# Patient Record
Sex: FEMALE | Race: BLACK OR AFRICAN AMERICAN
[De-identification: names, ages, dates, MRNs, and addresses within clinical notes are randomized per-mention and may not be internally consistent; named-entity substitution may affect disease eponyms.]

---

## 2023-04-21 ENCOUNTER — DASHBOARD ENCOUNTERS (OUTPATIENT)
Age: 58
End: 2023-04-21

## 2023-04-21 ENCOUNTER — LAB OUTSIDE AN ENCOUNTER (OUTPATIENT)
Dept: URBAN - METROPOLITAN AREA CLINIC 105 | Facility: CLINIC | Age: 58
End: 2023-04-21

## 2023-04-21 ENCOUNTER — OFFICE VISIT (OUTPATIENT)
Dept: URBAN - METROPOLITAN AREA CLINIC 105 | Facility: CLINIC | Age: 58
End: 2023-04-21
Payer: COMMERCIAL

## 2023-04-21 VITALS
TEMPERATURE: 97.5 F | HEART RATE: 83 BPM | HEIGHT: 66 IN | DIASTOLIC BLOOD PRESSURE: 72 MMHG | SYSTOLIC BLOOD PRESSURE: 106 MMHG | BODY MASS INDEX: 20.41 KG/M2 | WEIGHT: 127 LBS

## 2023-04-21 DIAGNOSIS — R49.0 HOARSENESS: ICD-10-CM

## 2023-04-21 DIAGNOSIS — D12.6 COLON ADENOMAS: ICD-10-CM

## 2023-04-21 PROCEDURE — 99244 OFF/OP CNSLTJ NEW/EST MOD 40: CPT | Performed by: INTERNAL MEDICINE

## 2023-04-21 PROCEDURE — 99203 OFFICE O/P NEW LOW 30 MIN: CPT | Performed by: INTERNAL MEDICINE

## 2023-04-21 RX ORDER — METHYLPREDNISOLONE 4 MG/1
TABLET ORAL
Qty: 21 TABLET | Status: ON HOLD | COMMUNITY

## 2023-04-21 RX ORDER — SODIUM SULFATE, POTASSIUM SULFATE, MAGNESIUM SULFATE 1.6; 3.13; 17.5 G/177ML; G/177ML; G/177ML
SOLUTION ORAL
Qty: 354 MILLILITER | Status: ON HOLD | COMMUNITY

## 2023-04-21 NOTE — HPI-TODAY'S VISIT:
4/21/23 58 yo lady pt of Dr Leslie Short. REferred for reflux. A copy of this note will be sent to her.  She had a colonoscopy last month with Odette Baig - two small TAs removed. another smaller polyp. Told repeat in 5 years.  Dr Leslie Short referred her to me. Says in 2020 had voice problems. she sings. Saw Dr Short had a throat infection. Treated with steroids etc and anti reflux diet/ She got better.  Later that year, it started again but not nearly as bad. Did steroids again and anti reflux diet as she had slacked off.  So this time she is trying to maintain an antireflux diet. Dr Short told her she has reflux. She has no heartburn. Her only symptom is throat clearning and hoarseness. Last time she took Omeprazole was 1.5 months ago. Back to normal voice.  Had covid last year and some residual cough which had resolved. No fhx of esophagus CA.  Eats dinner before 8 pm. Sleeps very late.

## 2023-05-09 ENCOUNTER — OFFICE VISIT (OUTPATIENT)
Dept: URBAN - METROPOLITAN AREA SURGERY CENTER 16 | Facility: SURGERY CENTER | Age: 58
End: 2023-05-09

## 2023-08-13 ENCOUNTER — WEB ENCOUNTER (OUTPATIENT)
Dept: URBAN - METROPOLITAN AREA SURGERY CENTER 16 | Facility: SURGERY CENTER | Age: 58
End: 2023-08-13

## 2023-08-15 ENCOUNTER — CLAIMS CREATED FROM THE CLAIM WINDOW (OUTPATIENT)
Dept: URBAN - METROPOLITAN AREA SURGERY CENTER 16 | Facility: SURGERY CENTER | Age: 58
End: 2023-08-15
Payer: COMMERCIAL

## 2023-08-15 ENCOUNTER — OFFICE VISIT (OUTPATIENT)
Dept: URBAN - METROPOLITAN AREA SURGERY CENTER 16 | Facility: SURGERY CENTER | Age: 58
End: 2023-08-15

## 2023-08-15 DIAGNOSIS — J02.9 SORE THROAT: ICD-10-CM

## 2023-08-15 DIAGNOSIS — R49.0 HOARSENESS: ICD-10-CM

## 2023-08-15 PROCEDURE — 43239 EGD BIOPSY SINGLE/MULTIPLE: CPT | Performed by: INTERNAL MEDICINE

## 2023-08-15 PROCEDURE — G8907 PT DOC NO EVENTS ON DISCHARG: HCPCS | Performed by: INTERNAL MEDICINE

## 2023-08-15 RX ORDER — METHYLPREDNISOLONE 4 MG/1
TABLET ORAL
Qty: 21 TABLET | Status: ON HOLD | COMMUNITY

## 2023-08-15 RX ORDER — SODIUM SULFATE, POTASSIUM SULFATE, MAGNESIUM SULFATE 1.6; 3.13; 17.5 G/177ML; G/177ML; G/177ML
SOLUTION ORAL
Qty: 354 MILLILITER | Status: ON HOLD | COMMUNITY

## 2023-10-19 ENCOUNTER — TELEPHONE ENCOUNTER (OUTPATIENT)
Dept: URBAN - METROPOLITAN AREA CLINIC 105 | Facility: CLINIC | Age: 58
End: 2023-10-19

## 2023-11-16 ENCOUNTER — OFFICE VISIT (OUTPATIENT)
Dept: URBAN - METROPOLITAN AREA CLINIC 105 | Facility: CLINIC | Age: 58
End: 2023-11-16

## 2023-12-01 ENCOUNTER — OFFICE VISIT (OUTPATIENT)
Dept: URBAN - METROPOLITAN AREA CLINIC 105 | Facility: CLINIC | Age: 58
End: 2023-12-01

## 2024-12-16 ENCOUNTER — OFFICE VISIT (OUTPATIENT)
Dept: URBAN - METROPOLITAN AREA CLINIC 105 | Facility: CLINIC | Age: 59
End: 2024-12-16
Payer: COMMERCIAL

## 2024-12-16 VITALS
HEIGHT: 66 IN | WEIGHT: 129 LBS | SYSTOLIC BLOOD PRESSURE: 116 MMHG | HEART RATE: 78 BPM | TEMPERATURE: 97 F | DIASTOLIC BLOOD PRESSURE: 77 MMHG | BODY MASS INDEX: 20.73 KG/M2

## 2024-12-16 DIAGNOSIS — R49.0 HOARSENESS: ICD-10-CM

## 2024-12-16 DIAGNOSIS — R05.3 CHRONIC COUGH: ICD-10-CM

## 2024-12-16 DIAGNOSIS — D12.6 COLON ADENOMAS: ICD-10-CM

## 2024-12-16 PROCEDURE — 99214 OFFICE O/P EST MOD 30 MIN: CPT | Performed by: INTERNAL MEDICINE

## 2024-12-16 RX ORDER — SODIUM SULFATE, POTASSIUM SULFATE, MAGNESIUM SULFATE 1.6; 3.13; 17.5 G/177ML; G/177ML; G/177ML
SOLUTION ORAL
Qty: 354 MILLILITER | Status: DISCONTINUED | COMMUNITY

## 2024-12-16 RX ORDER — METHYLPREDNISOLONE 4 MG/1
TABLET ORAL
Qty: 21 TABLET | Status: DISCONTINUED | COMMUNITY

## 2024-12-16 NOTE — HPI-TODAY'S VISIT:
4/21/23 58 yo lady pt of Dr Leslie Short. REferred for reflux. A copy of this note will be sent to her.  She had a colonoscopy last month with Odette Baig - two small TAs removed. another smaller polyp. Told repeat in 5 years.  Dr Leslie Short referred her to me. Says in 2020 had voice problems. she sings. Saw Dr Short had a throat infection. Treated with steroids etc and anti reflux diet/ She got better.  Later that year, it started again but not nearly as bad. Did steroids again and anti reflux diet as she had slacked off.  So this time she is trying to maintain an antireflux diet. Dr Short told her she has reflux. She has no heartburn. Her only symptom is throat clearning and hoarseness. Last time she took Omeprazole was 1.5 months ago. Back to normal voice.  Had covid last year and some residual cough which had resolved. No fhx of esophagus CA.  Eats dinner before 8 pm. Sleeps very late.  12/16/24 I have not seen since last year EGD 2023 wnl including esophagus bx Says ENT DR REGINALD Short is at her whit's end with now to treat her for chronic cough It gets better and then recurs She is worried about her singing. She has been to speech therapy and does all the things but the cough does not stop. Sometimes in AM and sometimes after eating.  She has no reflux symptoms.  She feels an irritation in the  AM on awakening and then coughs to get relief.  She has not seen PULM No typical reflux symptoms.